# Patient Record
Sex: MALE | Race: OTHER | NOT HISPANIC OR LATINO | URBAN - METROPOLITAN AREA
[De-identification: names, ages, dates, MRNs, and addresses within clinical notes are randomized per-mention and may not be internally consistent; named-entity substitution may affect disease eponyms.]

---

## 2021-08-24 VITALS
RESPIRATION RATE: 16 BRPM | DIASTOLIC BLOOD PRESSURE: 77 MMHG | HEART RATE: 72 BPM | OXYGEN SATURATION: 99 % | SYSTOLIC BLOOD PRESSURE: 138 MMHG | TEMPERATURE: 97 F

## 2021-08-24 RX ORDER — CHLORHEXIDINE GLUCONATE 213 G/1000ML
1 SOLUTION TOPICAL ONCE
Refills: 0 | Status: DISCONTINUED | OUTPATIENT
Start: 2021-09-13 | End: 2021-09-28

## 2021-08-24 NOTE — H&P ADULT - ASSESSMENT
60 yo Male, FHX CAD/CVA (mother), PMHx Hyperlipidemia presented to cardiologist, Dr Gilman, endorsing CCS Class IV anginal symptoms and abnormal stress echo pt presents for recommended cardiac catheterization with possible intervention.     - Patient took ASA 81mg on 9/13.  Loaded with Plavix 600mg PO x1  - pre caht fluids ordered    Risks & benefits of procedure and alternative therapy have been explained to the patient including but not limited to: allergic reaction, bleeding w/possible need for blood transfusion, infection, renal and vascular compromise, limb damage, arrhythmia, stroke, vessel dissection/perforation, Myocardial infarction, emergent CABG. Informed consent obtained and in chart.

## 2021-08-24 NOTE — H&P ADULT - HISTORY OF PRESENT ILLNESS
SKELETON    Cardiologist: Dr Gilman  Escort:  Pharmacy:  COVID PCR:       62 yo Male, PMHx Hyperlipidemia presented to cardiologist, Dr Gilman, endorsing substernal chest pressure, nonradiating, independent of activity. Denies SOB, orthopnea, PND, dizziness, syncope, palpitations. As per MD note NST 08/2021 shows moderate hypokinesis in mid-basal and inferior and inferoseptal regions (ischemia). Also shows 1.5mm horizontal ST depressions in inferolateral leads at peak stress. EF 55-60%.  Cardiologist: Dr Gilman  Escort: wife  Pharmacy: [facility] [address]  COVID PCR: Fri 8/27/21 at 2pm at [facility]    Confirm medications on arrival    Need NST report from Dr Gilman's office    62 yo Male, FHX CAD/CVA (mother), PMHx Hyperlipidemia presented to cardiologist, Dr Gilman, endorsing substernal chest pressure, nonradiating, independent of activity according to MD note. Pt describes symptoms as "stomach twitching" happening independent of activity. Denies SOB, orthopnea, PND, dizziness, syncope, palpitations, syncope, LE edema, orthopnea, fevers/chills, cough, COVID symptoms or exposure to anyone with active Covid infection. As per MD note NST 08/2021 shows moderate hypokinesis in mid-basal and inferior and inferoseptal regions (ischemia). Also shows 1.5mm horizontal ST depressions in inferolateral leads at peak stress. EF 55-60%.     In light of pt's risk factors, CCS Class IV anginal symptoms and abnormal NST pt presents for recommended cardiac catheterization with possible intervention.  Cardiologist: Dr Gilman  Escort: wife  Pharmacy: [facility] [address]  COVID PCR: 9/10/21 at [facility]    Confirm medications    60 yo Male, FHX CAD/CVA (mother), PMHx Hyperlipidemia presented to cardiologist, Dr Gilman, endorsing substernal chest pressure, nonradiating, independent of activity, according to MD note. Pt describes symptoms as "stomach twitching" happening independent of activity. Denies SOB, orthopnea, PND, dizziness, syncope, palpitations, syncope, LE edema, orthopnea, fevers/chills, cough, COVID symptoms or exposure to anyone with active Covid infection. As per MD note, exercise stress echo 08/2021 shows moderate hypokinesis in mid-basal and inferior and inferoseptal regions (ischemia). Also shows 1.5mm horizontal ST depressions in inferolateral leads at peak stress. EF 55-60%.     In light of pt's risk factors, CCS Class IV anginal symptoms and abnormal stress echo pt presents for recommended cardiac catheterization with possible intervention.  Cardiologist: Dr Gilman  Escort: wife  Pharmacy: [facility] [address]  COVID PCR: 9/10/21 at [address] - negative in HIE      60 yo Male, FHX CAD/CVA (mother), PMHx Hyperlipidemia presented to cardiologist, Dr Gilman, endorsing substernal chest pressure, nonradiating, independent of activity, according to MD note. Pt describes symptoms as "stomach twitching" happening independent of activity. Denies SOB, orthopnea, PND, dizziness, syncope, palpitations, syncope, LE edema, orthopnea, fevers/chills, cough, COVID symptoms or exposure to anyone with active Covid infection. As per MD note, exercise stress echo 08/2021 shows moderate hypokinesis in mid-basal and inferior and inferoseptal regions (ischemia). Also shows 1.5mm horizontal ST depressions in inferolateral leads at peak stress. EF 55-60%.     In light of pt's risk factors, CCS Class IV anginal symptoms and abnormal stress echo pt presents for recommended cardiac catheterization with possible intervention.

## 2021-08-24 NOTE — H&P ADULT - NSICDXFAMILYHX_GEN_ALL_CORE_FT
FAMILY HISTORY:  Mother  Still living? Unknown  FH: coronary artery disease, Age at diagnosis: Age Unknown

## 2021-09-13 ENCOUNTER — OUTPATIENT (OUTPATIENT)
Dept: OUTPATIENT SERVICES | Facility: HOSPITAL | Age: 61
LOS: 1 days | Discharge: HOME | End: 2021-09-13
Payer: COMMERCIAL

## 2021-09-13 LAB
A1C WITH ESTIMATED AVERAGE GLUCOSE RESULT: 6 % — HIGH (ref 4–5.6)
ALBUMIN SERPL ELPH-MCNC: 4.8 G/DL — SIGNIFICANT CHANGE UP (ref 3.3–5)
ALP SERPL-CCNC: 45 U/L — SIGNIFICANT CHANGE UP (ref 40–120)
ALT FLD-CCNC: 34 U/L — SIGNIFICANT CHANGE UP (ref 10–45)
ANION GAP SERPL CALC-SCNC: 15 MMOL/L — SIGNIFICANT CHANGE UP (ref 5–17)
APTT BLD: 31.7 SEC — SIGNIFICANT CHANGE UP (ref 27.5–35.5)
AST SERPL-CCNC: 33 U/L — SIGNIFICANT CHANGE UP (ref 10–40)
BASOPHILS # BLD AUTO: 0.04 K/UL — SIGNIFICANT CHANGE UP (ref 0–0.2)
BASOPHILS NFR BLD AUTO: 0.5 % — SIGNIFICANT CHANGE UP (ref 0–2)
BILIRUB SERPL-MCNC: 0.3 MG/DL — SIGNIFICANT CHANGE UP (ref 0.2–1.2)
BUN SERPL-MCNC: 15 MG/DL — SIGNIFICANT CHANGE UP (ref 7–23)
CALCIUM SERPL-MCNC: 9.6 MG/DL — SIGNIFICANT CHANGE UP (ref 8.4–10.5)
CHLORIDE SERPL-SCNC: 102 MMOL/L — SIGNIFICANT CHANGE UP (ref 96–108)
CHOLEST SERPL-MCNC: 202 MG/DL — HIGH
CK MB CFR SERPL CALC: 5.4 NG/ML — SIGNIFICANT CHANGE UP (ref 0–6.7)
CK SERPL-CCNC: 319 U/L — HIGH (ref 30–200)
CO2 SERPL-SCNC: 22 MMOL/L — SIGNIFICANT CHANGE UP (ref 22–31)
CREAT SERPL-MCNC: 0.76 MG/DL — SIGNIFICANT CHANGE UP (ref 0.5–1.3)
EOSINOPHIL # BLD AUTO: 0.1 K/UL — SIGNIFICANT CHANGE UP (ref 0–0.5)
EOSINOPHIL NFR BLD AUTO: 1.2 % — SIGNIFICANT CHANGE UP (ref 0–6)
ESTIMATED AVERAGE GLUCOSE: 126 MG/DL — HIGH (ref 68–114)
GLUCOSE SERPL-MCNC: 98 MG/DL — SIGNIFICANT CHANGE UP (ref 70–99)
HCT VFR BLD CALC: 46.5 % — SIGNIFICANT CHANGE UP (ref 39–50)
HDLC SERPL-MCNC: 58 MG/DL — SIGNIFICANT CHANGE UP
HGB BLD-MCNC: 13.7 G/DL — SIGNIFICANT CHANGE UP (ref 13–17)
IMM GRANULOCYTES NFR BLD AUTO: 0.7 % — SIGNIFICANT CHANGE UP (ref 0–1.5)
INR BLD: 0.98 — SIGNIFICANT CHANGE UP (ref 0.88–1.16)
LIPID PNL WITH DIRECT LDL SERPL: 107 MG/DL — HIGH
LYMPHOCYTES # BLD AUTO: 2 K/UL — SIGNIFICANT CHANGE UP (ref 1–3.3)
LYMPHOCYTES # BLD AUTO: 24.6 % — SIGNIFICANT CHANGE UP (ref 13–44)
MCHC RBC-ENTMCNC: 21.9 PG — LOW (ref 27–34)
MCHC RBC-ENTMCNC: 29.5 GM/DL — LOW (ref 32–36)
MCV RBC AUTO: 74.3 FL — LOW (ref 80–100)
MONOCYTES # BLD AUTO: 0.73 K/UL — SIGNIFICANT CHANGE UP (ref 0–0.9)
MONOCYTES NFR BLD AUTO: 9 % — SIGNIFICANT CHANGE UP (ref 2–14)
NEUTROPHILS # BLD AUTO: 5.2 K/UL — SIGNIFICANT CHANGE UP (ref 1.8–7.4)
NEUTROPHILS NFR BLD AUTO: 64 % — SIGNIFICANT CHANGE UP (ref 43–77)
NON HDL CHOLESTEROL: 144 MG/DL — HIGH
NRBC # BLD: 0 /100 WBCS — SIGNIFICANT CHANGE UP (ref 0–0)
PLATELET # BLD AUTO: 260 K/UL — SIGNIFICANT CHANGE UP (ref 150–400)
POTASSIUM SERPL-MCNC: 4.1 MMOL/L — SIGNIFICANT CHANGE UP (ref 3.5–5.3)
POTASSIUM SERPL-SCNC: 4.1 MMOL/L — SIGNIFICANT CHANGE UP (ref 3.5–5.3)
PROT SERPL-MCNC: 8 G/DL — SIGNIFICANT CHANGE UP (ref 6–8.3)
PROTHROM AB SERPL-ACNC: 11.8 SEC — SIGNIFICANT CHANGE UP (ref 10.6–13.6)
RBC # BLD: 6.26 M/UL — HIGH (ref 4.2–5.8)
RBC # FLD: 14.2 % — SIGNIFICANT CHANGE UP (ref 10.3–14.5)
SODIUM SERPL-SCNC: 139 MMOL/L — SIGNIFICANT CHANGE UP (ref 135–145)
TRIGL SERPL-MCNC: 183 MG/DL — HIGH
WBC # BLD: 8.13 K/UL — SIGNIFICANT CHANGE UP (ref 3.8–10.5)
WBC # FLD AUTO: 8.13 K/UL — SIGNIFICANT CHANGE UP (ref 3.8–10.5)

## 2021-09-13 PROCEDURE — 85610 PROTHROMBIN TIME: CPT

## 2021-09-13 PROCEDURE — 36415 COLL VENOUS BLD VENIPUNCTURE: CPT

## 2021-09-13 PROCEDURE — 80053 COMPREHEN METABOLIC PANEL: CPT

## 2021-09-13 PROCEDURE — C1887: CPT

## 2021-09-13 PROCEDURE — 85025 COMPLETE CBC W/AUTO DIFF WBC: CPT

## 2021-09-13 PROCEDURE — 93005 ELECTROCARDIOGRAM TRACING: CPT

## 2021-09-13 PROCEDURE — 99152 MOD SED SAME PHYS/QHP 5/>YRS: CPT

## 2021-09-13 PROCEDURE — 83036 HEMOGLOBIN GLYCOSYLATED A1C: CPT

## 2021-09-13 PROCEDURE — 93010 ELECTROCARDIOGRAM REPORT: CPT

## 2021-09-13 PROCEDURE — 82550 ASSAY OF CK (CPK): CPT

## 2021-09-13 PROCEDURE — 80061 LIPID PANEL: CPT

## 2021-09-13 PROCEDURE — 85730 THROMBOPLASTIN TIME PARTIAL: CPT

## 2021-09-13 PROCEDURE — C1769: CPT

## 2021-09-13 PROCEDURE — 93454 CORONARY ARTERY ANGIO S&I: CPT

## 2021-09-13 PROCEDURE — 82553 CREATINE MB FRACTION: CPT

## 2021-09-13 PROCEDURE — C1894: CPT

## 2021-09-13 RX ORDER — SODIUM CHLORIDE 9 MG/ML
500 INJECTION INTRAMUSCULAR; INTRAVENOUS; SUBCUTANEOUS
Refills: 0 | Status: DISCONTINUED | OUTPATIENT
Start: 2021-09-13 | End: 2021-09-28

## 2021-09-13 RX ORDER — METOPROLOL TARTRATE 50 MG
1 TABLET ORAL
Qty: 0 | Refills: 0 | DISCHARGE

## 2021-09-13 RX ORDER — METOPROLOL TARTRATE 50 MG
0.5 TABLET ORAL
Qty: 0 | Refills: 0 | DISCHARGE

## 2021-09-13 RX ORDER — CLOPIDOGREL BISULFATE 75 MG/1
600 TABLET, FILM COATED ORAL ONCE
Refills: 0 | Status: COMPLETED | OUTPATIENT
Start: 2021-09-13 | End: 2021-09-13

## 2021-09-13 RX ORDER — SIMVASTATIN 20 MG/1
1 TABLET, FILM COATED ORAL
Qty: 0 | Refills: 0 | DISCHARGE

## 2021-09-13 RX ORDER — ASPIRIN/CALCIUM CARB/MAGNESIUM 324 MG
1 TABLET ORAL
Qty: 0 | Refills: 0 | DISCHARGE

## 2021-09-13 RX ORDER — SODIUM CHLORIDE 9 MG/ML
500 INJECTION INTRAMUSCULAR; INTRAVENOUS; SUBCUTANEOUS
Refills: 0 | Status: DISCONTINUED | OUTPATIENT
Start: 2021-09-13 | End: 2021-09-13

## 2021-09-13 RX ADMIN — CLOPIDOGREL BISULFATE 600 MILLIGRAM(S): 75 TABLET, FILM COATED ORAL at 15:02

## 2021-09-13 RX ADMIN — SODIUM CHLORIDE 75 MILLILITER(S): 9 INJECTION INTRAMUSCULAR; INTRAVENOUS; SUBCUTANEOUS at 15:02

## 2021-09-13 NOTE — PROGRESS NOTE ADULT - SUBJECTIVE AND OBJECTIVE BOX
Interventional Cardiology PA SDA Discharge Note    Patient without complaints. Ambulated and voided without difficulties    Afebrile, VSS    Ext:        Right Radial : No hematoma, no bleeding, dressing; C/D/I      Pulses:    intact RAD to baseline     A/P:  62 yo Male, FHX CAD/CVA (mother), PMHx Hyperlipidemia presented to cardiologist, Dr Gilman, endorsing substernal chest pressure, nonradiating, independent of activity, according to MD note. Pt describes symptoms as "stomach twitching" happening independent of activity. Denies SOB, orthopnea, PND, dizziness, syncope, palpitations, syncope, LE edema, orthopnea, fevers/chills, cough, COVID symptoms or exposure to anyone with active Covid infection. As per MD note, exercise stress echo 08/2021 shows moderate hypokinesis in mid-basal and inferior and inferoseptal regions (ischemia). Also shows 1.5mm horizontal ST depressions in inferolateral leads at peak stress. EF 55-60%.  In light of pt's risk factors, CCS Class IV anginal symptoms and abnormal stress echo pt presents for recommended cardiac catheterization with possible intervention.     Patient is s/p cardiac cath 09/13/2021: Normal coronaries. EF: normal as per echo. EDP not measured.     1.	Stable for discharge as per attending Dr. Gilman after bed rest, pt voids, and 30 minutes of ambulation.  2.	Follow-up with Cardiologist, Dr. Gilman.   3.	Discharged forms signed and copies in chart

## 2021-09-20 DIAGNOSIS — I20.8 OTHER FORMS OF ANGINA PECTORIS: ICD-10-CM

## 2021-09-20 DIAGNOSIS — Z82.49 FAMILY HISTORY OF ISCHEMIC HEART DISEASE AND OTHER DISEASES OF THE CIRCULATORY SYSTEM: ICD-10-CM

## 2021-09-20 DIAGNOSIS — R94.39 ABNORMAL RESULT OF OTHER CARDIOVASCULAR FUNCTION STUDY: ICD-10-CM
